# Patient Record
Sex: FEMALE | Race: WHITE | NOT HISPANIC OR LATINO | URBAN - NONMETROPOLITAN AREA
[De-identification: names, ages, dates, MRNs, and addresses within clinical notes are randomized per-mention and may not be internally consistent; named-entity substitution may affect disease eponyms.]

---

## 2020-07-07 ENCOUNTER — IMPORTED ENCOUNTER (OUTPATIENT)
Dept: URBAN - NONMETROPOLITAN AREA CLINIC 1 | Facility: CLINIC | Age: 75
End: 2020-07-07

## 2020-07-07 PROBLEM — H35.3211: Noted: 2021-06-15

## 2020-07-07 PROBLEM — H52.12: Noted: 2021-06-15

## 2020-07-07 PROBLEM — H52.222: Noted: 2021-06-15

## 2020-07-07 PROBLEM — H25.13: Noted: 2020-07-07

## 2020-07-07 PROBLEM — H35.3122: Noted: 2020-07-07

## 2020-07-07 PROBLEM — H35.3122: Noted: 2021-06-15

## 2020-07-07 PROBLEM — H35.3114: Noted: 2020-07-07

## 2020-07-07 PROBLEM — H52.4: Noted: 2021-06-15

## 2020-07-07 PROCEDURE — 92014 COMPRE OPH EXAM EST PT 1/>: CPT

## 2020-07-07 NOTE — PATIENT DISCUSSION
AMD - dry OU/possible Wet OD-Explained dry AMD and advised that there are currently no treatments available. -Recommend pt begin AREDS 2 MVT and monitoring Amsler grid.-Amsler grid given and explained to pt. Recommend monitoring daily. Pt is to contact our office if any changes are noted. Cataract OU-Not yet surgical. -Reviewed symptoms of advancing cataract growth such as glare and halos and decreased vision.-Continue to monitor for now. Pt will notify us if any new symptoms develop.

## 2021-06-15 ENCOUNTER — IMPORTED ENCOUNTER (OUTPATIENT)
Dept: URBAN - NONMETROPOLITAN AREA CLINIC 1 | Facility: CLINIC | Age: 76
End: 2021-06-15

## 2021-06-15 PROBLEM — H35.3122: Noted: 2021-06-15

## 2021-06-15 PROBLEM — H52.4: Noted: 2021-06-15

## 2021-06-15 PROCEDURE — 92015 DETERMINE REFRACTIVE STATE: CPT

## 2021-06-15 PROCEDURE — 99214 OFFICE O/P EST MOD 30 MIN: CPT

## 2021-06-15 PROCEDURE — 92134 CPTRZ OPH DX IMG PST SGM RTA: CPT

## 2021-06-15 NOTE — PATIENT DISCUSSION
AMD - dry OS wet OD -Explained dry AMD and advised that there are currently no treatments available. -Recommend pt begin AREDS 2 MVT and monitoring Amsler grid.-Amsler grid given and explained to pt. Recommend monitoring daily. Pt is to contact our office if any changes are noted. -OCT MAC ordered performed and reviewed w/pt Cataract OU-Not yet surgical. -Reviewed symptoms of advancing cataract growth such as glare and halos and decreased vision.-Continue to monitor for now. Pt will notify us if any new symptoms develop. Myopia/astigmatism/presbyopia -Discussed diagnosis with patient. -Explained that people who are myopic are at a higher risk for developing RD/RT and reviewed associated S&S.-Pt to contact our office if symptoms develop. Updated spec Rx given.   Recommend lens that will provide comfort as well as protect safety and health of eyes.; Dr's Notes: PCP: Dr. Johny Muhammad

## 2022-04-15 ASSESSMENT — TONOMETRY
OS_IOP_MMHG: 19
OS_IOP_MMHG: 15
OD_IOP_MMHG: 18
OD_IOP_MMHG: 15

## 2022-04-15 ASSESSMENT — VISUAL ACUITY
OS_CC: 20/20
OS_SC: 20/40
OS_PH: 20/50+1
OD_SC: CF4'
OD_CC: CF1'
OS_SC: 20/80

## 2022-06-20 ENCOUNTER — ESTABLISHED PATIENT (OUTPATIENT)
Dept: RURAL CLINIC 2 | Facility: CLINIC | Age: 77
End: 2022-06-20

## 2022-06-20 DIAGNOSIS — H25.813: ICD-10-CM

## 2022-06-20 DIAGNOSIS — H35.3122: ICD-10-CM

## 2022-06-20 DIAGNOSIS — H35.3213: ICD-10-CM

## 2022-06-20 DIAGNOSIS — H43.813: ICD-10-CM

## 2022-06-20 PROCEDURE — 92134 CPTRZ OPH DX IMG PST SGM RTA: CPT

## 2022-06-20 PROCEDURE — 92014 COMPRE OPH EXAM EST PT 1/>: CPT

## 2022-06-20 ASSESSMENT — VISUAL ACUITY
OS_CC: 20/30
OD_CC: CF 2FT

## 2022-06-20 ASSESSMENT — TONOMETRY
OD_IOP_MMHG: 15
OS_IOP_MMHG: 15

## 2022-10-17 ENCOUNTER — ESTABLISHED PATIENT (OUTPATIENT)
Dept: RURAL CLINIC 2 | Facility: CLINIC | Age: 77
End: 2022-10-17

## 2022-10-17 DIAGNOSIS — H25.813: ICD-10-CM

## 2022-10-17 DIAGNOSIS — H43.813: ICD-10-CM

## 2022-10-17 DIAGNOSIS — H16.223: ICD-10-CM

## 2022-10-17 DIAGNOSIS — H35.3122: ICD-10-CM

## 2022-10-17 DIAGNOSIS — H35.3213: ICD-10-CM

## 2022-10-17 PROCEDURE — 92014 COMPRE OPH EXAM EST PT 1/>: CPT

## 2022-10-17 PROCEDURE — 92134 CPTRZ OPH DX IMG PST SGM RTA: CPT

## 2022-10-17 ASSESSMENT — VISUAL ACUITY
OS_CC: 20/20-1
OD_CC: CF 2FT

## 2022-10-17 ASSESSMENT — TONOMETRY
OD_IOP_MMHG: 15
OS_IOP_MMHG: 15

## 2023-04-24 ENCOUNTER — ESTABLISHED PATIENT (OUTPATIENT)
Dept: RURAL CLINIC 2 | Facility: CLINIC | Age: 78
End: 2023-04-24

## 2023-04-24 DIAGNOSIS — H35.3122: ICD-10-CM

## 2023-04-24 DIAGNOSIS — H35.3213: ICD-10-CM

## 2023-04-24 DIAGNOSIS — H43.813: ICD-10-CM

## 2023-04-24 DIAGNOSIS — H25.813: ICD-10-CM

## 2023-04-24 DIAGNOSIS — H16.223: ICD-10-CM

## 2023-04-24 PROCEDURE — 68761 CLOSE TEAR DUCT OPENING: CPT

## 2023-04-24 PROCEDURE — 92134 CPTRZ OPH DX IMG PST SGM RTA: CPT

## 2023-04-24 PROCEDURE — 99214 OFFICE O/P EST MOD 30 MIN: CPT

## 2023-04-24 ASSESSMENT — TONOMETRY
OS_IOP_MMHG: 14
OD_IOP_MMHG: 14

## 2023-04-24 ASSESSMENT — VISUAL ACUITY
OS_CC: 20/25-2
OD_CC: CF 2FT

## 2023-10-25 ENCOUNTER — ESTABLISHED PATIENT (OUTPATIENT)
Dept: RURAL CLINIC 2 | Facility: CLINIC | Age: 78
End: 2023-10-25

## 2023-10-25 DIAGNOSIS — H16.223: ICD-10-CM

## 2023-10-25 DIAGNOSIS — H25.813: ICD-10-CM

## 2023-10-25 DIAGNOSIS — H35.3122: ICD-10-CM

## 2023-10-25 DIAGNOSIS — H43.813: ICD-10-CM

## 2023-10-25 DIAGNOSIS — H35.3213: ICD-10-CM

## 2023-10-25 PROCEDURE — 68761 CLOSE TEAR DUCT OPENING: CPT

## 2023-10-25 PROCEDURE — 92134 CPTRZ OPH DX IMG PST SGM RTA: CPT

## 2023-10-25 PROCEDURE — 99214 OFFICE O/P EST MOD 30 MIN: CPT | Mod: 25

## 2023-10-25 ASSESSMENT — TONOMETRY
OD_IOP_MMHG: 15
OS_IOP_MMHG: 19

## 2023-10-25 ASSESSMENT — VISUAL ACUITY
OS_CC: 20/25
OD_CC: CF 2FT

## 2024-04-25 ENCOUNTER — ESTABLISHED PATIENT (OUTPATIENT)
Dept: RURAL CLINIC 2 | Facility: CLINIC | Age: 79
End: 2024-04-25

## 2024-04-25 DIAGNOSIS — H16.223: ICD-10-CM

## 2024-04-25 DIAGNOSIS — H43.813: ICD-10-CM

## 2024-04-25 DIAGNOSIS — H35.3122: ICD-10-CM

## 2024-04-25 DIAGNOSIS — H35.3213: ICD-10-CM

## 2024-04-25 DIAGNOSIS — H25.813: ICD-10-CM

## 2024-04-25 PROCEDURE — 92134 CPTRZ OPH DX IMG PST SGM RTA: CPT

## 2024-04-25 PROCEDURE — 92014 COMPRE OPH EXAM EST PT 1/>: CPT

## 2024-04-25 ASSESSMENT — TONOMETRY
OS_IOP_MMHG: 18
OD_IOP_MMHG: 16

## 2024-04-25 ASSESSMENT — VISUAL ACUITY
OS_CC: 20/40-2
OD_CC: CF 2FT

## 2024-05-08 ENCOUNTER — CONSULTATION/EVALUATION (OUTPATIENT)
Dept: RURAL CLINIC 1 | Facility: CLINIC | Age: 79
End: 2024-05-08

## 2024-05-08 DIAGNOSIS — H35.3122: ICD-10-CM

## 2024-05-08 DIAGNOSIS — H35.3213: ICD-10-CM

## 2024-05-08 DIAGNOSIS — H25.89: ICD-10-CM

## 2024-05-08 PROCEDURE — 92136 OPHTHALMIC BIOMETRY: CPT

## 2024-05-08 PROCEDURE — 92134 CPTRZ OPH DX IMG PST SGM RTA: CPT

## 2024-05-08 PROCEDURE — 92025 CPTRIZED CORNEAL TOPOGRAPHY: CPT | Mod: NC

## 2024-05-08 PROCEDURE — 99214 OFFICE O/P EST MOD 30 MIN: CPT

## 2024-05-08 ASSESSMENT — TONOMETRY
OD_IOP_MMHG: 17
OS_IOP_MMHG: 17

## 2024-05-08 ASSESSMENT — VISUAL ACUITY
OS_SC: 20/80
OS_CC: 20/50
OS_CC: 20/20
OS_PH: 20/50
OS_AM: 20/20
OS_BAT: 20/60

## 2024-06-03 ENCOUNTER — PRE-OP/H&P (OUTPATIENT)
Dept: RURAL CLINIC 1 | Facility: CLINIC | Age: 79
End: 2024-06-03

## 2024-06-03 VITALS
WEIGHT: 135 LBS | HEIGHT: 66 IN | SYSTOLIC BLOOD PRESSURE: 122 MMHG | DIASTOLIC BLOOD PRESSURE: 68 MMHG | BODY MASS INDEX: 21.69 KG/M2 | HEART RATE: 59 BPM

## 2024-06-03 DIAGNOSIS — Z01.818: ICD-10-CM

## 2024-06-03 DIAGNOSIS — E78.2: ICD-10-CM

## 2024-06-03 DIAGNOSIS — I48.91: ICD-10-CM

## 2024-06-03 DIAGNOSIS — I10: ICD-10-CM

## 2024-06-03 PROCEDURE — 99213 OFFICE O/P EST LOW 20 MIN: CPT

## 2024-06-11 ENCOUNTER — SURGERY/PROCEDURE (OUTPATIENT)
Facility: LOCATION | Age: 79
End: 2024-06-11

## 2024-06-11 DIAGNOSIS — H25.89: ICD-10-CM

## 2024-06-11 PROCEDURE — 66982 XCAPSL CTRC RMVL CPLX WO ECP: CPT

## 2024-06-12 ENCOUNTER — POST-OP (OUTPATIENT)
Dept: RURAL CLINIC 2 | Facility: CLINIC | Age: 79
End: 2024-06-12

## 2024-06-12 DIAGNOSIS — Z96.1: ICD-10-CM

## 2024-06-12 PROCEDURE — 99024 POSTOP FOLLOW-UP VISIT: CPT

## 2024-06-12 ASSESSMENT — VISUAL ACUITY: OS_SC: 20/400

## 2024-06-12 ASSESSMENT — TONOMETRY: OS_IOP_MMHG: 21

## 2024-06-24 ENCOUNTER — POST-OP (OUTPATIENT)
Dept: RURAL CLINIC 2 | Facility: CLINIC | Age: 79
End: 2024-06-24

## 2024-06-24 DIAGNOSIS — Z96.1: ICD-10-CM

## 2024-06-24 PROCEDURE — 99024 POSTOP FOLLOW-UP VISIT: CPT

## 2024-06-24 ASSESSMENT — TONOMETRY: OS_IOP_MMHG: 18

## 2024-06-24 ASSESSMENT — VISUAL ACUITY: OS_SC: 20/40

## 2024-10-29 ENCOUNTER — FOLLOW UP (OUTPATIENT)
Dept: RURAL CLINIC 2 | Facility: CLINIC | Age: 79
End: 2024-10-29

## 2024-10-29 DIAGNOSIS — H25.89: ICD-10-CM

## 2024-10-29 DIAGNOSIS — H16.223: ICD-10-CM

## 2024-10-29 DIAGNOSIS — H43.813: ICD-10-CM

## 2024-10-29 DIAGNOSIS — H35.3122: ICD-10-CM

## 2024-10-29 DIAGNOSIS — H35.3213: ICD-10-CM

## 2024-10-29 DIAGNOSIS — Z96.1: ICD-10-CM

## 2024-10-29 PROCEDURE — 92134 CPTRZ OPH DX IMG PST SGM RTA: CPT

## 2024-10-29 PROCEDURE — 99214 OFFICE O/P EST MOD 30 MIN: CPT

## 2025-04-29 ENCOUNTER — FOLLOW UP (OUTPATIENT)
Age: 80
End: 2025-04-29

## 2025-04-29 DIAGNOSIS — H43.813: ICD-10-CM

## 2025-04-29 DIAGNOSIS — H25.89: ICD-10-CM

## 2025-04-29 DIAGNOSIS — H35.3122: ICD-10-CM

## 2025-04-29 DIAGNOSIS — H16.223: ICD-10-CM

## 2025-04-29 DIAGNOSIS — Z96.1: ICD-10-CM

## 2025-04-29 DIAGNOSIS — H35.3213: ICD-10-CM

## 2025-04-29 PROCEDURE — 92134 CPTRZ OPH DX IMG PST SGM RTA: CPT

## 2025-04-29 PROCEDURE — 99214 OFFICE O/P EST MOD 30 MIN: CPT

## 2025-06-25 ENCOUNTER — CONSULTATION/EVALUATION (OUTPATIENT)
Age: 80
End: 2025-06-25

## 2025-06-25 DIAGNOSIS — H35.3213: ICD-10-CM

## 2025-06-25 DIAGNOSIS — H35.3122: ICD-10-CM

## 2025-06-25 DIAGNOSIS — Z96.1: ICD-10-CM

## 2025-06-25 DIAGNOSIS — H25.89: ICD-10-CM

## 2025-06-25 PROCEDURE — 92134 CPTRZ OPH DX IMG PST SGM RTA: CPT

## 2025-06-25 PROCEDURE — 92136 OPHTHALMIC BIOMETRY: CPT

## 2025-06-25 PROCEDURE — 99214 OFFICE O/P EST MOD 30 MIN: CPT

## 2025-06-25 PROCEDURE — 92025 CPTRIZED CORNEAL TOPOGRAPHY: CPT | Mod: NC

## 2025-07-07 ENCOUNTER — PRE-OP/H&P (OUTPATIENT)
Age: 80
End: 2025-07-07

## 2025-07-07 VITALS
SYSTOLIC BLOOD PRESSURE: 114 MMHG | WEIGHT: 135 LBS | DIASTOLIC BLOOD PRESSURE: 72 MMHG | HEIGHT: 66 IN | BODY MASS INDEX: 21.69 KG/M2 | HEART RATE: 79 BPM

## 2025-07-07 DIAGNOSIS — E78.2: ICD-10-CM

## 2025-07-07 DIAGNOSIS — I48.91: ICD-10-CM

## 2025-07-07 DIAGNOSIS — I10: ICD-10-CM

## 2025-07-07 DIAGNOSIS — Z01.818: ICD-10-CM

## 2025-07-07 PROCEDURE — 99213 OFFICE O/P EST LOW 20 MIN: CPT

## 2025-07-15 ENCOUNTER — SURGERY/PROCEDURE (OUTPATIENT)
Age: 80
End: 2025-07-15

## 2025-07-15 DIAGNOSIS — H25.811: ICD-10-CM

## 2025-07-15 DIAGNOSIS — H25.89: ICD-10-CM

## 2025-07-15 PROCEDURE — 66984 XCAPSL CTRC RMVL W/O ECP: CPT

## 2025-07-16 ENCOUNTER — POST-OP (OUTPATIENT)
Age: 80
End: 2025-07-16

## 2025-07-16 DIAGNOSIS — Z96.1: ICD-10-CM

## 2025-07-16 PROCEDURE — 99024 POSTOP FOLLOW-UP VISIT: CPT

## 2025-07-23 ENCOUNTER — POST-OP (OUTPATIENT)
Age: 80
End: 2025-07-23

## 2025-07-23 DIAGNOSIS — Z96.1: ICD-10-CM

## 2025-07-23 PROCEDURE — 99024 POSTOP FOLLOW-UP VISIT: CPT
